# Patient Record
Sex: MALE | URBAN - METROPOLITAN AREA
[De-identification: names, ages, dates, MRNs, and addresses within clinical notes are randomized per-mention and may not be internally consistent; named-entity substitution may affect disease eponyms.]

---

## 2018-11-05 ENCOUNTER — APPOINTMENT (RX ONLY)
Dept: URBAN - METROPOLITAN AREA CLINIC 110 | Facility: CLINIC | Age: 53
Setting detail: DERMATOLOGY
End: 2018-11-05

## 2018-11-05 DIAGNOSIS — N62 HYPERTROPHY OF BREAST: ICD-10-CM

## 2018-11-05 DIAGNOSIS — L73.2 HIDRADENITIS SUPPURATIVA: ICD-10-CM

## 2018-11-05 PROBLEM — I10 ESSENTIAL (PRIMARY) HYPERTENSION: Status: ACTIVE | Noted: 2018-11-05

## 2018-11-05 PROBLEM — M12.9 ARTHROPATHY, UNSPECIFIED: Status: ACTIVE | Noted: 2018-11-05

## 2018-11-05 PROBLEM — E13.9 OTHER SPECIFIED DIABETES MELLITUS WITHOUT COMPLICATIONS: Status: ACTIVE | Noted: 2018-11-05

## 2018-11-05 PROCEDURE — ? COUNSELING

## 2018-11-05 PROCEDURE — 99203 OFFICE O/P NEW LOW 30 MIN: CPT

## 2018-11-05 PROCEDURE — ? PRESCRIPTION

## 2018-11-05 RX ORDER — DOXYCYCLINE HYCLATE 100 MG/1
CAPSULE, GELATIN COATED ORAL
Qty: 60 | Refills: 1 | Status: ERX | COMMUNITY
Start: 2018-11-05

## 2018-11-05 RX ADMIN — DOXYCYCLINE HYCLATE: 100 CAPSULE, GELATIN COATED ORAL at 00:00

## 2018-11-05 ASSESSMENT — LOCATION SIMPLE DESCRIPTION DERM
LOCATION SIMPLE: RIGHT BUTTOCK
LOCATION SIMPLE: LEFT BUTTOCK
LOCATION SIMPLE: CHEST

## 2018-11-05 ASSESSMENT — LOCATION DETAILED DESCRIPTION DERM
LOCATION DETAILED: LEFT BUTTOCK
LOCATION DETAILED: RIGHT BUTTOCK
LOCATION DETAILED: LEFT LATERAL INFERIOR CHEST

## 2018-11-05 ASSESSMENT — LOCATION ZONE DERM: LOCATION ZONE: TRUNK

## 2018-11-05 NOTE — PROCEDURE: MIPS QUALITY
Detail Level: Detailed
Quality 110: Preventive Care And Screening: Influenza Immunization: Influenza Immunization Administered during Influenza season
Quality 431: Preventive Care And Screening: Unhealthy Alcohol Use - Screening: Patient screened for unhealthy alcohol use using a single question and scores less than 2 times per year
Quality 402: Tobacco Use And Help With Quitting Among Adolescents: Patient screened for tobacco and never smoked

## 2018-11-05 NOTE — HPI: BODY LOCATION - TRUNK
How Severe Is Your Condition?: severe
Additional History: Patient had mammogram completed(lesion reported benign), PCP referred to our office for further evaluation.

## 2018-11-05 NOTE — PROCEDURE: COUNSELING
Patient Specific Counseling (Will Not Stick From Patient To Patient): Discussed starting Cellcept in detail at next visit, will need baseline labs - will obtain copy of last labs from PCP and then at next visit order any others needed. As primarily pt has chronic scar tissues rather than many new lesions, the primary management of his condition should be surgical. He is interested in potentially starting Cellcept to decrease new lesion formation but will wait to start as active lesions are minimal today. Will obtain labs next visit.
Detail Level: Zone
Patient Specific Counseling (Will Not Stick From Patient To Patient): ASSESSMENT: \\n\\nNo clear medication causes (pt with cardiac history but not on calcium channel blockers; nebivolol has been a/w gynecomastia rarely but not other beta blockers). No signs of cellulitis/infection. Per report mammogram (-) but instructed pt to bring copy to next visit. Increased prolactin levels in CKD is also a consideration. \\n\\nPlan: Compresses BID to the area. Taking course of doxy for HS, may decrease inflammation in breast as well. Will await full mammogram report and get labs from prior workup. If does not resolve, will add any hormonal w/u not performed (TSH, FH, LH, estradiol, prolactin, testosterone, estrogen, hCG) would recommend referral to plastic surgery for deep incisional biopsy.
Detail Level: Detailed

## 2018-12-04 ENCOUNTER — APPOINTMENT (RX ONLY)
Dept: URBAN - METROPOLITAN AREA CLINIC 110 | Facility: CLINIC | Age: 53
Setting detail: DERMATOLOGY
End: 2018-12-04

## 2018-12-04 DIAGNOSIS — L73.2 HIDRADENITIS SUPPURATIVA: ICD-10-CM | Status: INADEQUATELY CONTROLLED

## 2018-12-04 DIAGNOSIS — N62 HYPERTROPHY OF BREAST: ICD-10-CM

## 2018-12-04 PROCEDURE — ? ORDER TESTS

## 2018-12-04 PROCEDURE — 99214 OFFICE O/P EST MOD 30 MIN: CPT

## 2018-12-04 PROCEDURE — ? COUNSELING

## 2018-12-04 ASSESSMENT — LOCATION DETAILED DESCRIPTION DERM
LOCATION DETAILED: LEFT LATERAL INFERIOR CHEST
LOCATION DETAILED: RIGHT BUTTOCK
LOCATION DETAILED: LEFT BUTTOCK

## 2018-12-04 ASSESSMENT — LOCATION SIMPLE DESCRIPTION DERM
LOCATION SIMPLE: CHEST
LOCATION SIMPLE: RIGHT BUTTOCK
LOCATION SIMPLE: LEFT BUTTOCK

## 2018-12-04 ASSESSMENT — LOCATION ZONE DERM: LOCATION ZONE: TRUNK

## 2018-12-04 NOTE — PROCEDURE: COUNSELING
Detail Level: Zone
Patient Specific Counseling (Will Not Stick From Patient To Patient): Culture performed today, to get updated status on bacteria resistance, advised pt continue remaining doxy that he has, but we won't refill anything until after the culture report comes in. He has a lot of chronic scaring, but is still weeping in several areas requiring him to keep the area heavily bandaged. He defers further surgical intervention at this time.\\n\\Dominick Glass discussed starting Cellcept at his last appt since his previous trial of humira exacerbated his CHF. I am referring back to Dr. Glass or Dr. Tuttle to start him and manage him further.
Detail Level: Detailed
Patient Specific Counseling (Will Not Stick From Patient To Patient): Pt did not bring copy of mammogram report to f.u today, he said he will go by the office this week and get them to fax us a copy. Recommended again that incisional bx with plastic surgeon would be best option since we have no clear cause for this mass. He is very resistant to any surgical procedures as he has been through a lot with his previous HS surgeries. Discussed getting labs drawn to assess for any abnormalities in hormone levels. He would rather get labs done at this time than surgical consult.

## 2018-12-04 NOTE — PROCEDURE: ORDER TESTS
Performing Laboratory: [id number]
Billing Type: Third-Party Bill
Expected Date Of Service: 12/04/2018
Bill For Surgical Tray: no
Expected Date Of Service: 12/05/2018
Lab Facility: [id number]
Billing Type: Third-Party Bill
Lab Facility: [id number]

## 2018-12-04 NOTE — PROCEDURE: MIPS QUALITY
Quality 431: Preventive Care And Screening: Unhealthy Alcohol Use - Screening: Patient screened for unhealthy alcohol use using a single question and scores less than 2 times per year
Quality 402: Tobacco Use And Help With Quitting Among Adolescents: Patient screened for tobacco and never smoked
Quality 110: Preventive Care And Screening: Influenza Immunization: Influenza Immunization Administered during Influenza season
Detail Level: Detailed

## 2018-12-14 ENCOUNTER — RX ONLY (OUTPATIENT)
Age: 53
Setting detail: RX ONLY
End: 2018-12-14

## 2018-12-14 RX ORDER — AMPICILLIN 500 MG/1
CAPSULE ORAL
Qty: 60 | Refills: 0 | Status: ERX | COMMUNITY
Start: 2018-12-14

## 2019-01-08 ENCOUNTER — APPOINTMENT (RX ONLY)
Dept: URBAN - METROPOLITAN AREA CLINIC 110 | Facility: CLINIC | Age: 54
Setting detail: DERMATOLOGY
End: 2019-01-08

## 2019-01-08 DIAGNOSIS — L73.2 HIDRADENITIS SUPPURATIVA: ICD-10-CM

## 2019-01-08 DIAGNOSIS — N62 HYPERTROPHY OF BREAST: ICD-10-CM

## 2019-01-08 PROCEDURE — ? TREATMENT REGIMEN

## 2019-01-08 PROCEDURE — ? ORDER TESTS

## 2019-01-08 PROCEDURE — ? COUNSELING

## 2019-01-08 PROCEDURE — ? PRESCRIPTION

## 2019-01-08 PROCEDURE — 99213 OFFICE O/P EST LOW 20 MIN: CPT

## 2019-01-08 RX ORDER — DICAPRYLYL CARBONATE/DIMETH
SPRAY, NON-AEROSOL (ML) TOPICAL
Qty: 1 | Refills: 2 | Status: ERX | COMMUNITY
Start: 2019-01-08

## 2019-01-08 RX ADMIN — Medication: at 00:00

## 2019-01-08 ASSESSMENT — LOCATION DETAILED DESCRIPTION DERM
LOCATION DETAILED: RIGHT BUTTOCK
LOCATION DETAILED: LEFT LATERAL INFERIOR CHEST
LOCATION DETAILED: LEFT BUTTOCK

## 2019-01-08 ASSESSMENT — LOCATION ZONE DERM: LOCATION ZONE: TRUNK

## 2019-01-08 ASSESSMENT — LOCATION SIMPLE DESCRIPTION DERM
LOCATION SIMPLE: RIGHT BUTTOCK
LOCATION SIMPLE: CHEST
LOCATION SIMPLE: LEFT BUTTOCK

## 2019-01-08 NOTE — PROCEDURE: ORDER TESTS
Expected Date Of Service: 12/05/2018
Lab Facility: [id number]
Bill For Surgical Tray: no
Lab Facility: [id number]
Billing Type: Third-Party Bill
Expected Date Of Service: 12/04/2018
Performing Laboratory: [id number]
Billing Type: Third-Party Bill
Lab Facility: [id number]

## 2019-01-08 NOTE — PROCEDURE: TREATMENT REGIMEN
Detail Level: Zone
Plan: Patient states that he has been taking ampicillin 500 mg and Augmentin QD. He is still seeing wound care routinely and having them change his bandages 2-3 times a week. Patient states that he is still experiencing some pain where the cysts are. Advised patient that he should follow up with Dr. Tuttle since he can't go on Humira due to worsening his CHF in the past. I gave him levicyn spray samples for him to start using as often as needed to prevent further infection from developing in the area and to help alleviate the itching and hopefully some of the pain he is experiencing.

## 2019-01-08 NOTE — PROCEDURE: COUNSELING
Detail Level: Zone
Detail Level: Detailed
Patient Specific Counseling (Will Not Stick From Patient To Patient): He has not had ultrasound redone as was advised by the radiologist on the initial report which included his mammogram and initial ultrasound results. We informed him of this request and advised him to get done prior to our appt today along with the labs that Dr. Glass ordered at his first visit with our office in November, but he hasn't done either of these things. Discussed again at appt today asking him to get labs done and redo his US as soon as possible and f/u with Dr. Tuttle in a month since Dr. Glass isn't contracted with his insurance.

## 2019-01-08 NOTE — PROCEDURE: MIPS QUALITY
Quality 110: Preventive Care And Screening: Influenza Immunization: Influenza Immunization Administered during Influenza season
Quality 431: Preventive Care And Screening: Unhealthy Alcohol Use - Screening: Patient screened for unhealthy alcohol use using a single question and scores less than 2 times per year
Quality 402: Tobacco Use And Help With Quitting Among Adolescents: Patient screened for tobacco and never smoked
Detail Level: Detailed

## 2019-02-05 ENCOUNTER — APPOINTMENT (RX ONLY)
Dept: URBAN - METROPOLITAN AREA CLINIC 114 | Facility: CLINIC | Age: 54
Setting detail: DERMATOLOGY
End: 2019-02-05

## 2019-02-05 DIAGNOSIS — Z79.899 OTHER LONG TERM (CURRENT) DRUG THERAPY: ICD-10-CM

## 2019-02-05 DIAGNOSIS — L73.2 HIDRADENITIS SUPPURATIVA: ICD-10-CM | Status: UNCHANGED

## 2019-02-05 PROCEDURE — ? STELARA INITIATION

## 2019-02-05 PROCEDURE — ? COUNSELING

## 2019-02-05 PROCEDURE — ? ORDER TESTS

## 2019-02-05 PROCEDURE — 99213 OFFICE O/P EST LOW 20 MIN: CPT

## 2019-02-05 PROCEDURE — ? TREATMENT REGIMEN

## 2019-02-05 ASSESSMENT — HURLEY STAGE
IN YOUR EXPERIENCE, AMONG ALL PATIENTS YOU HAVE SEEN WITH THIS CONDITION, HOW SEVERE IS THIS PATIENT'S CONDITION?: HURLEY STAGE III: MULTIPLE INTERCONNECTED SINUS TRACTS AND ABSCESSES THROUGHOUT AN ENTIRE AREA

## 2019-02-05 ASSESSMENT — LOCATION SIMPLE DESCRIPTION DERM
LOCATION SIMPLE: LEFT BUTTOCK
LOCATION SIMPLE: RIGHT BUTTOCK

## 2019-02-05 ASSESSMENT — LOCATION DETAILED DESCRIPTION DERM
LOCATION DETAILED: LEFT BUTTOCK
LOCATION DETAILED: RIGHT BUTTOCK

## 2019-02-05 ASSESSMENT — LOCATION ZONE DERM: LOCATION ZONE: TRUNK

## 2019-02-05 NOTE — PROCEDURE: ORDER TESTS
Bill For Surgical Tray: no
Performing Laboratory: [id number]
Billing Type: Third-Party Bill
Expected Date Of Service: 02/05/2019

## 2019-02-05 NOTE — PROCEDURE: TREATMENT REGIMEN
Plan: Patient states that he has been taking ampicillin 500 mg and Augmentin QD. He is still seeing wound care routinely and having them change his bandages 2-3 times a week. Patient states that he is still experiencing some pain where the cysts are. Wound care gave him metronidazole cream to apply once a day to wound at last appt. Pt has failed Humira and Metformin. Advised pt to take augmentin and discontinue Ampicillin. Will try for Stelara and remicade. Will speak with patient’s cardiologist to get opinion on starting pt on remicade.
Detail Level: Zone

## 2019-02-05 NOTE — PROCEDURE: STELARA INITIATION
Pregnancy And Lactation Warning Text: This medication is Pregnancy Category B and is considered safe during pregnancy. It is unknown if this medication is excreted in breast milk.
Is Phototherapy Contraindicated?: No
Detail Level: Zone
Stelara Monitoring Guidelines: A yearly test for tuberculosis is required while taking Stelara.
Comments: failed humira therefore trying Stelara approval patient advised nit fda approved but other colleagues using
Stelara Dosing: 90mg SC at week 0 and 4 and then every 12 weeks thereafter
Diagnosis (Required): Psoriasis

## 2019-03-05 ENCOUNTER — APPOINTMENT (RX ONLY)
Dept: URBAN - METROPOLITAN AREA CLINIC 114 | Facility: CLINIC | Age: 54
Setting detail: DERMATOLOGY
End: 2019-03-05

## 2019-03-05 DIAGNOSIS — N62 HYPERTROPHY OF BREAST: ICD-10-CM

## 2019-03-05 DIAGNOSIS — L73.2 HIDRADENITIS SUPPURATIVA: ICD-10-CM

## 2019-03-05 PROCEDURE — 99213 OFFICE O/P EST LOW 20 MIN: CPT

## 2019-03-05 PROCEDURE — ? COUNSELING

## 2019-03-05 PROCEDURE — ? TREATMENT REGIMEN

## 2019-03-05 ASSESSMENT — LOCATION ZONE DERM: LOCATION ZONE: TRUNK

## 2019-03-05 ASSESSMENT — LOCATION SIMPLE DESCRIPTION DERM
LOCATION SIMPLE: LEFT BUTTOCK
LOCATION SIMPLE: CHEST
LOCATION SIMPLE: RIGHT BUTTOCK

## 2019-03-05 NOTE — PROCEDURE: TREATMENT REGIMEN
Detail Level: Zone
Continue Regimen: Linezolid from Dr. Dejon Lira
Plan: Discussed starting stelara samples. Pt has failed Clindamycin and rifampin
Initiate Treatment: Cln wash
Plan: Will refer to Dr. Rivera. Advised pt to not get any procedures done unless he gets approval from infectious disease doctor and cardiologist

## 2019-04-02 ENCOUNTER — APPOINTMENT (RX ONLY)
Dept: URBAN - METROPOLITAN AREA CLINIC 114 | Facility: CLINIC | Age: 54
Setting detail: DERMATOLOGY
End: 2019-04-02

## 2019-04-02 DIAGNOSIS — L73.2 HIDRADENITIS SUPPURATIVA: ICD-10-CM | Status: WORSENING

## 2019-04-02 DIAGNOSIS — N62 HYPERTROPHY OF BREAST: ICD-10-CM

## 2019-04-02 PROCEDURE — ? COUNSELING

## 2019-04-02 PROCEDURE — 99213 OFFICE O/P EST LOW 20 MIN: CPT

## 2019-04-02 PROCEDURE — ? PRESCRIPTION SAMPLES PROVIDED

## 2019-04-02 PROCEDURE — ? TREATMENT REGIMEN

## 2019-04-02 PROCEDURE — ? OTEZLA INITIATION

## 2019-04-02 PROCEDURE — ? REFERRAL

## 2019-04-02 ASSESSMENT — LOCATION SIMPLE DESCRIPTION DERM
LOCATION SIMPLE: LEFT BUTTOCK
LOCATION SIMPLE: CHEST
LOCATION SIMPLE: RIGHT BUTTOCK

## 2019-04-02 ASSESSMENT — LOCATION ZONE DERM: LOCATION ZONE: TRUNK

## 2019-04-02 NOTE — PROCEDURE: TREATMENT REGIMEN
Continue Regimen: Linezolid from [facility]
Plan: Patient reports having increased pain throughout the body and discussed referring to pain management doctor through PCP. Additionally, patient is experiencing fevers, chills, night sweats, decreased appetite and weight loss. Patient denies recent lab work through PCP and it was strongly encouraged to schedule an appointment to address these concerns. Plan to call patients PCP, discuss our mot recent findings, and emphasize reaching out to the patient for management.
Initiate Treatment: Otezla 30mg QD
Detail Level: Zone
Plan: Re-discussed that we are referring to Dr. Rivera for further treatment. Patient expressed understanding to not get any procedures done unless he gets approval from infectious disease doctor and cardiologist. Per the patient, he still has not gone to Dr. Rivera